# Patient Record
Sex: FEMALE | Employment: OTHER | URBAN - METROPOLITAN AREA
[De-identification: names, ages, dates, MRNs, and addresses within clinical notes are randomized per-mention and may not be internally consistent; named-entity substitution may affect disease eponyms.]

---

## 2019-06-15 PROBLEM — R06.02 SOB (SHORTNESS OF BREATH): Status: ACTIVE | Noted: 2019-06-15

## 2022-08-14 ENCOUNTER — APPOINTMENT (OUTPATIENT)
Dept: GENERAL RADIOLOGY | Age: 87
End: 2022-08-14
Attending: EMERGENCY MEDICINE
Payer: MEDICARE

## 2022-08-14 ENCOUNTER — HOSPITAL ENCOUNTER (EMERGENCY)
Age: 87
Discharge: NURSING, REHAB OR LONG-TERM CARE | End: 2022-08-14
Attending: EMERGENCY MEDICINE
Payer: MEDICARE

## 2022-08-14 ENCOUNTER — HOSPITAL ENCOUNTER (EMERGENCY)
Dept: CT IMAGING | Age: 87
Discharge: HOME | End: 2022-08-14
Attending: EMERGENCY MEDICINE
Payer: MEDICARE

## 2022-08-14 VITALS
WEIGHT: 128.53 LBS | HEART RATE: 68 BPM | TEMPERATURE: 98.1 F | DIASTOLIC BLOOD PRESSURE: 77 MMHG | BODY MASS INDEX: 23.51 KG/M2 | OXYGEN SATURATION: 95 % | SYSTOLIC BLOOD PRESSURE: 149 MMHG | RESPIRATION RATE: 15 BRPM
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
## 2022-08-14 DIAGNOSIS — S40.011A CONTUSION OF RIGHT SHOULDER, INITIAL ENCOUNTER: ICD-10-CM

## 2022-08-14 DIAGNOSIS — S80.01XA CONTUSION OF RIGHT KNEE, INITIAL ENCOUNTER: ICD-10-CM

## 2022-08-14 PROCEDURE — 73060 X-RAY EXAM OF HUMERUS: CPT

## 2022-08-14 PROCEDURE — 72125 CT NECK SPINE W/O DYE: CPT

## 2022-08-14 PROCEDURE — 70450 CT HEAD/BRAIN W/O DYE: CPT

## 2022-08-14 PROCEDURE — 73030 X-RAY EXAM OF SHOULDER: CPT

## 2022-08-14 PROCEDURE — 99284 EMERGENCY DEPT VISIT MOD MDM: CPT

## 2022-08-14 PROCEDURE — 73562 X-RAY EXAM OF KNEE 3: CPT

## 2022-08-14 RX ORDER — ACETAMINOPHEN 500 MG
1000 TABLET ORAL ONCE
Status: DISCONTINUED | OUTPATIENT
Start: 2022-08-14 | End: 2022-08-14 | Stop reason: HOSPADM

## 2022-08-14 RX ORDER — LIDOCAINE 50 MG/G
PATCH TOPICAL
Qty: 30 EACH | Refills: 0 | Status: SHIPPED | OUTPATIENT
Start: 2022-08-14

## 2022-08-14 RX ORDER — LIDOCAINE 4 G/100G
1 PATCH TOPICAL
Status: DISCONTINUED | OUTPATIENT
Start: 2022-08-14 | End: 2022-08-14 | Stop reason: HOSPADM

## 2022-08-14 NOTE — ED NOTES
Patient reports she fell sometime this morning. Does not recall details. Hx dementia- patient does not know where she is and repeatedly saying [facility] Unable to answer all triage questions. C/o R shoulder pain. Refused tylenol. Awaiting CT and xray results. Call light in hand.

## 2022-08-14 NOTE — ED TRIAGE NOTES
Brought by EMS from [facility]. Fell this morning. C/o R shoulder and R knee pain.

## 2022-08-14 NOTE — PROGRESS NOTES
10:23 AM  CM consulted to assist with patient transport back to [facility]. Address verified. BLS transport needed. CM submitted a referral to [facility]. CM awaiting receipt of referral and confirmed ETA for transport. PCS form completed and provided to ED for patient transport. CM will continue to follow for [facility] needs as they arise. 10:51 AM  [facility] is able to accept and accommodate patient for 11:45 am.  RN notified of updates.      ELVIRA Rashid/JOEL  Care Management

## 2022-08-14 NOTE — ED NOTES
Spoke with Karin Gunderson, Charge Nurse, at [facility]. Pt is being transported back via [facility]. No fx seen after fall.

## 2022-08-15 NOTE — ED PROVIDER NOTES
51-year-old female with history as below presents to the emergency department by EMS from her nursing facility after she reportedly fell this morning. She complains of right shoulder and right knee pain. Patient does not recall the details of the fall, history of dementia. Uncertain if she hit her head or not. EMS is uncertain if she is on blood thinners or not. History is provided by EMS and to a very limited extent from the patient secondary to dementia. Fall       Past Medical History:   Diagnosis Date    Anxiety     Dementia ([address]     Depression     Goiter     High cholesterol     Hypertension     Hypothyroidism        History reviewed. No pertinent surgical history. History reviewed. No pertinent family history. Social History     Socioeconomic History    Marital status:      Spouse name: Not on file    Number of children: Not on file    Years of education: Not on file    Highest education level: Not on file   Occupational History    Not on file   Tobacco Use    Smoking status: Never    Smokeless tobacco: Never   Substance and Sexual Activity    Alcohol use: Never    Drug use: Not on file    Sexual activity: Not on file   Other Topics Concern    Not on file   Social History Narrative    Not on file     Social Determinants of Health     Financial Resource Strain: Not on file   Food Insecurity: Not on file   Transportation Needs: Not on file   Physical Activity: Not on file   Stress: Not on file   Social Connections: Not on file   Intimate Partner Violence: Not on file   Housing Stability: Not on file         ALLERGIES: Patient has no known allergies. Review of Systems   Unable to perform ROS: Dementia     Vitals:    08/14/22 0800 08/14/22 1020   BP: (!) 149/66 (!) 149/77   Pulse: 71 68   Resp: 16 15   Temp: 98.1 °F (36.7 °C)    SpO2: 93% 95%   Weight: 58.3 kg (128 lb 8.5 oz)             Physical Exam  Vitals and nursing note reviewed.    Constitutional:       General: She is not in acute distress. Appearance: Normal appearance. She is well-developed. She is not diaphoretic. Comments: Elderly, frail appearing, confused at baseline. HENT:      Head: Normocephalic and atraumatic. Nose: Nose normal.   Eyes:      Extraocular Movements: Extraocular movements intact. Conjunctiva/sclera: Conjunctivae normal.      Pupils: Pupils are equal, round, and reactive to light. Cardiovascular:      Rate and Rhythm: Normal rate and regular rhythm. Heart sounds: Normal heart sounds. Pulmonary:      Effort: Pulmonary effort is normal.      Breath sounds: Normal breath sounds. Abdominal:      General: There is no distension. Palpations: Abdomen is soft. Tenderness: There is no abdominal tenderness. Musculoskeletal:      Right shoulder: Tenderness and bony tenderness present. No swelling, deformity, laceration or crepitus. Decreased range of motion. Normal strength. Normal pulse. Cervical back: Neck supple. Right knee: Bony tenderness present. No swelling, deformity, erythema, ecchymosis or crepitus. Decreased range of motion. Tenderness present. Normal alignment. Normal pulse. Skin:     General: Skin is warm and dry. Neurological:      General: No focal deficit present. Mental Status: She is alert. Mental status is at baseline. She is disoriented. Cranial Nerves: No cranial nerve deficit. Sensory: No sensory deficit. Motor: No weakness. Coordination: Coordination normal.        MDM    57-year-old female presents with GL F from nursing facility. X-rays of right shoulder and right knee which were the areas of tenderness on exam showed no acute bony abnormalities  But does show evidence of osteoarthritis. CT head and C-spine shows no acute intracranial abnormalities, arthritis in the neck but no acute fracture.     Her results were discussed with her son by phone who agreed with plan for arranging to transport her back to her nursing facility for further care. Please note that this dictation was completed with [employer], the computer voice recognition software. Quite often unanticipated grammatical, syntax, homophones, and other interpretive errors are inadvertently transcribed by the computer software. Please disregard these errors. Please excuse any errors that have escaped final proofreading.       Procedures